# Patient Record
Sex: MALE | Race: WHITE
[De-identification: names, ages, dates, MRNs, and addresses within clinical notes are randomized per-mention and may not be internally consistent; named-entity substitution may affect disease eponyms.]

---

## 2018-01-05 ENCOUNTER — HOSPITAL ENCOUNTER (EMERGENCY)
Dept: HOSPITAL 36 - ER | Age: 48
LOS: 1 days | Discharge: HOME | End: 2018-01-06
Payer: MEDICAID

## 2018-01-05 DIAGNOSIS — Z87.11: ICD-10-CM

## 2018-01-05 DIAGNOSIS — R31.9: Primary | ICD-10-CM

## 2018-01-05 DIAGNOSIS — K21.9: ICD-10-CM

## 2018-01-05 PROCEDURE — 81001 URINALYSIS AUTO W/SCOPE: CPT

## 2018-01-05 PROCEDURE — 96372 THER/PROPH/DIAG INJ SC/IM: CPT

## 2018-01-05 PROCEDURE — 85025 COMPLETE CBC W/AUTO DIFF WBC: CPT

## 2018-01-05 PROCEDURE — 74020: CPT

## 2018-01-05 PROCEDURE — 36415 COLL VENOUS BLD VENIPUNCTURE: CPT

## 2018-01-05 PROCEDURE — 99285 EMERGENCY DEPT VISIT HI MDM: CPT

## 2018-01-06 LAB
APPEARANCE UR: (no result)
BACTERIA #/AREA URNS HPF: (no result) /HPF
BASOPHILS # BLD AUTO: 0.1 TH/CUMM (ref 0–0.2)
BASOPHILS NFR BLD AUTO: 0.6 % (ref 0–2)
BILIRUB UR-MCNC: NEGATIVE MG/DL
COLOR UR: YELLOW
EOSINOPHIL # BLD AUTO: 0.3 TH/CMM (ref 0.1–0.4)
EOSINOPHIL NFR BLD AUTO: 3.1 % (ref 0–5)
EPI CELLS URNS QL MICRO: (no result) /LPF
ERYTHROCYTE [DISTWIDTH] IN BLOOD BY AUTOMATED COUNT: 12.4 % (ref 11.5–20)
GLUCOSE UR STRIP-MCNC: NEGATIVE MG/DL
HCT VFR BLD CALC: 50.5 % (ref 41–60)
HGB BLD-MCNC: 16.9 GM/DL (ref 12–16)
KETONES UR STRIP-MCNC: NEGATIVE MG/DL
LEUKOCYTE ESTERASE UR-ACNC: NEGATIVE
LYMPHOCYTE AB SER FC-ACNC: 1.8 TH/CMM (ref 1.5–3)
LYMPHOCYTES NFR BLD AUTO: 17.1 % (ref 20–50)
MCH RBC QN AUTO: 29.2 PG (ref 26–30)
MCHC RBC AUTO-ENTMCNC: 33.4 PG (ref 28–36)
MCV RBC AUTO: 87.3 FL (ref 80–99)
MICRO URNS: YES
MONOCYTES # BLD AUTO: 0.7 TH/CMM (ref 0.3–1)
MONOCYTES NFR BLD AUTO: 7 % (ref 2–10)
NEUTROPHILS # BLD: 7.6 TH/CMM (ref 1.8–8)
NEUTROPHILS NFR BLD AUTO: 72.2 % (ref 40–80)
NITRITE UR QL STRIP: NEGATIVE
PH UR STRIP: 5.5 [PH] (ref 4.6–8)
PLATELET # BLD: 223 TH/CMM (ref 150–400)
PMV BLD AUTO: 7.5 FL
PROT UR STRIP-MCNC: NEGATIVE MG/DL
RBC # BLD AUTO: 5.79 MIL/CMM (ref 4.3–5.7)
RBC # UR STRIP: (no result) /UL
RBC #/AREA URNS HPF: (no result) /HPF (ref 0–5)
SP GR UR STRIP: 1.02 (ref 1–1.03)
URINALYSIS COMPLETE PNL UR: (no result)
UROBILINOGEN UR STRIP-ACNC: 0.2 E.U./DL (ref 0.2–1)
WBC # BLD AUTO: 10.5 TH/CMM (ref 4.8–10.8)
WBC #/AREA URNS HPF: (no result) /HPF (ref 0–5)

## 2018-01-06 NOTE — ED PHYSICIAN CHART
ED Chief Complaint/HPI





- Patient Information


Date Seen:: 01/06/18


Time Seen:: 00:23


Chief Complaint::  LEFT SIDED STOMACH AND FLANK PAIN


History of Present Illness:: 





 LEFT SIDED STOMACH AND FLANK PAIN RECENT ONSET, SEEN BY PMD, TOLD HE HAD A 

BLADDER INFECTION.


Allergies:: 


 Allergies











Allergy/AdvReac Type Severity Reaction Status Date / Time


 


No Known Allergies Allergy   Verified 01/06/18 00:23











Vitals:: 


 Vital Signs - 8 hr











  01/06/18





  00:23


 


Temp 98.1 F


 


HR 75


 


RR 20


 


/69


 


O2 Sat % 97











Historian:: Patient


Review:: Nurse's Note Reviewed





ED Review of Systems





- Review of Systems


General/Constitutional: No fever


Skin: No skin lesions


Head: No headache


Eyes: No loss of vision


ENT: No tinnitus


Neck: No neck pain


Cardio Vascular: No chest pain


Pulmonary: No SOB


GI: Pain


G/U: No dysuria, Hematuria


Musculoskeletal: Back pain


Endocrine: No polydipsia


Psychiatric: No prior psych history


Hematopoietic: No bruising


Allergic/Immuno: No urticaria


Neurological: No syncope





ED Past Medical History





- Past Medical History


Past Medical History: PUD/GERD


Family History: None


Social History: Non Smoker, No Alcohol, No Drug Use





Family Medical History





- Family Member


  ** Mother


History Unknown: Yes





ED Physical Exam





- Physical Examination


General/Constitutional: Well-developed, well-nourished, Non-toxic appearing


Head: Atraumatic


Eyes: Lids, conjuctiva normal


Skin: Nl inspection


ENMT: External ears, nose nl


Neck: Nontender


Respiratory: Nl effort/Exclusion


Cardio Vascular: RRR


Other GI comments:: 





TENDERNESS, LEFT UPPER QUADRANT, NO GUARDING OR REBOUND.


Extremities: Full ROM


Neuro/Psych: Alert/oriented


Misc: Normal back





ED Labs/Radiology/EKG Results





- Lab Results


Results: 


 Laboratory Tests











  01/06/18 01/06/18





  00:25 00:35


 


WBC   10.5


 


RBC   5.79 H


 


Hgb   16.9


 


Hct   50.5


 


MCV   87.3


 


MCH   29.2


 


MCHC Differential   33.4


 


RDW   12.4


 


Plt Count   223


 


MPV   7.5


 


Neutrophils %   72.2


 


Lymphocytes %   17.1 L


 


Monocytes %   7.0


 


Eosinophils %   3.1


 


Basophils %   0.6


 


Urine Source  RANDOM 


 


Urine Color  YELLOW 


 


Urine Clarity  HAZY 


 


Urine pH  5.5 


 


Ur Specific Gravity  1.025 


 


Urine Protein  NEGATIVE 


 


Urine Glucose (UA)  NEGATIVE 


 


Urine Ketones  NEGATIVE 


 


Urine Blood  SMALL H 


 


Urine Nitrate  NEGATIVE 


 


Urine Bilirubin  NEGATIVE 


 


Urine Urobilinogen  0.2 


 


Ur Leukocyte Esterase  NEGATIVE 


 


Urine RBC  5-10 H 


 


Urine WBC  2-5 H 


 


Ur Epithelial Cells  OCCASIONAL 


 


Urine Bacteria  FEW 














ED Assessment





- Assessment


General Assessment: 





PATIENT IN MILD TO MODERATE DISTRESS WITHOUT PERITONITIS. LEFT SIDE RADIATING 

TO THE BACK.





ED Septic Shock





- .


Is Septic Shock (SBP<90, OR Lactate>4 mmol\L) present?: No





- <6hrs of presentation:


Vital Signs: 


 Vital Signs - 8 hr











  01/06/18





  00:23


 


Temp 98.1 F


 


HR 75


 


RR 20


 


/69


 


O2 Sat % 97














ED Reassessment (Disposition)





- Reassessment


Reassessment:: 





ABDOMINAL SERIES NO FREE AIR, NO MENTION OF KIDNEY STONE, IMPROVED WITH TORADOL 

INJECTION, NO SIGN OF PERITONITIS.


Reassessment Condition:: Improved (IMPROVED WITH TORADOL)





- Diagnosis


Diagnosis:: 





HEMATURIA ETIOLOGY UNKNOWN





- Aftercare/Follow up Instructions


Aftercare/Follow-Up Instructions:: Counseled pt regarding lab results/diagnosis 

& need follow up, Refer to Discharge Instructions


Medication Prescribed:: 





INDOMETHACIN 25 TID





- Patient Disposition


Discharge/Transfer:: Home


Transport Method:: Private





ED Discharge Plan





- Patient Disposition


Admit/Discharge/Transfer: PT DISCHARGED HOME


Condition at Disposition: Stable


Instructions:  Hematuria, Adult


Additional Instructions: 


MAKE A FOLLOW UP WITH PRIMARY MEDICAL DOCTOR TOMORROW, DRINK LOTS OF WATER, GO 

BACK TO EMERGENCY ROOM IF SYMPTOMS WORSEN.

## 2018-01-06 NOTE — DIAGNOSTIC IMAGING REPORT
Abdominal series 2 views



Indication: pain



Comparison: none



Findings: Moderate amount of stool is seen. No evidence of gross free

air. Nonspecific gas-filled loops of bowel are noted. The osseous

structures are intact.



Impression:



Moderate amount of stool in an overall nonspecific bowel gas pattern.

Please correlate with clinical findings.